# Patient Record
Sex: FEMALE | Race: WHITE
[De-identification: names, ages, dates, MRNs, and addresses within clinical notes are randomized per-mention and may not be internally consistent; named-entity substitution may affect disease eponyms.]

---

## 2020-12-09 ENCOUNTER — HOSPITAL ENCOUNTER (OUTPATIENT)
Dept: HOSPITAL 53 - M LABSMTC | Age: 40
End: 2020-12-09
Attending: OPHTHALMOLOGY
Payer: COMMERCIAL

## 2020-12-09 DIAGNOSIS — Z11.59: ICD-10-CM

## 2020-12-09 DIAGNOSIS — Z01.812: Primary | ICD-10-CM

## 2020-12-23 ENCOUNTER — HOSPITAL ENCOUNTER (OUTPATIENT)
Dept: HOSPITAL 53 - M LABSMTC | Age: 40
End: 2020-12-23
Attending: OPHTHALMOLOGY
Payer: COMMERCIAL

## 2020-12-23 DIAGNOSIS — Z20.828: Primary | ICD-10-CM

## 2022-01-02 ENCOUNTER — HOSPITAL ENCOUNTER (OUTPATIENT)
Dept: HOSPITAL 53 - M RAD | Age: 42
End: 2022-01-02
Attending: PHYSICIAN ASSISTANT
Payer: COMMERCIAL

## 2022-01-02 DIAGNOSIS — R05.9: Primary | ICD-10-CM

## 2022-01-02 NOTE — REP
INDICATION:

COUGH



COMPARISON:

None.



TECHNIQUE:

PA and lateral.



FINDINGS:

The mediastinum and cardiac silhouette are normal.  The lung fields are clear and

without acute consolidation, effusion, or pneumothorax.  The skeletal structures are

intact and normal.



IMPRESSION:

No acute cardiopulmonary process.





<Electronically signed by Tyrell Camara > 01/02/22 1440